# Patient Record
Sex: MALE | URBAN - METROPOLITAN AREA
[De-identification: names, ages, dates, MRNs, and addresses within clinical notes are randomized per-mention and may not be internally consistent; named-entity substitution may affect disease eponyms.]

---

## 2020-12-14 ENCOUNTER — OUT OF OFFICE VISIT (OUTPATIENT)
Dept: URBAN - METROPOLITAN AREA MEDICAL CENTER 1 | Facility: MEDICAL CENTER | Age: 70
End: 2020-12-14
Payer: COMMERCIAL

## 2020-12-14 DIAGNOSIS — I85.11 SECONDARY ESOPHAGEAL VARICES WITH BLEEDING: ICD-10-CM

## 2020-12-14 DIAGNOSIS — I10 ACCELERATED ESSENTIAL HYPERTENSION: ICD-10-CM

## 2020-12-14 DIAGNOSIS — K70.30 ALC (ALCOHOLIC LIVER CIRRHOSIS): ICD-10-CM

## 2020-12-14 DIAGNOSIS — K92.0 BLOODY EMESIS: ICD-10-CM

## 2020-12-14 DIAGNOSIS — E11.9 ABNORMAL METABOLIC STATE DUE TO DIABETES MELLITUS: ICD-10-CM

## 2020-12-14 PROCEDURE — 99254 IP/OBS CNSLTJ NEW/EST MOD 60: CPT | Performed by: INTERNAL MEDICINE

## 2020-12-14 PROCEDURE — G0500 MOD SEDAT ENDO SERVICE >5YRS: HCPCS | Performed by: INTERNAL MEDICINE

## 2020-12-14 PROCEDURE — 43244 EGD VARICES LIGATION: CPT | Performed by: INTERNAL MEDICINE

## 2020-12-15 ENCOUNTER — OUT OF OFFICE VISIT (OUTPATIENT)
Dept: URBAN - METROPOLITAN AREA MEDICAL CENTER 1 | Facility: MEDICAL CENTER | Age: 70
End: 2020-12-15
Payer: MEDICARE

## 2020-12-15 DIAGNOSIS — K92.0 BLOODY EMESIS: ICD-10-CM

## 2020-12-15 DIAGNOSIS — I85.10 ESOPH VARICE OTHER DIS: ICD-10-CM

## 2020-12-15 DIAGNOSIS — D62 ACUTE BLOOD LOSS ANEMIA: ICD-10-CM

## 2020-12-15 DIAGNOSIS — K70.31 ALCOHOLIC CIRRHOSIS OF LIVER WITH ASCITES: ICD-10-CM

## 2020-12-15 PROCEDURE — 99232 SBSQ HOSP IP/OBS MODERATE 35: CPT | Performed by: INTERNAL MEDICINE

## 2020-12-18 ENCOUNTER — OUT OF OFFICE VISIT (OUTPATIENT)
Dept: URBAN - METROPOLITAN AREA MEDICAL CENTER 1 | Facility: MEDICAL CENTER | Age: 70
End: 2020-12-18
Payer: MEDICARE

## 2020-12-18 DIAGNOSIS — K92.0 BLOODY EMESIS: ICD-10-CM

## 2020-12-18 DIAGNOSIS — I85.10 ESOPH VARICE OTHER DIS: ICD-10-CM

## 2020-12-18 DIAGNOSIS — D62 ACUTE BLOOD LOSS ANEMIA: ICD-10-CM

## 2020-12-18 DIAGNOSIS — K70.31 ALCOHOLIC CIRRHOSIS OF LIVER WITH ASCITES: ICD-10-CM

## 2020-12-18 PROCEDURE — 99232 SBSQ HOSP IP/OBS MODERATE 35: CPT | Performed by: INTERNAL MEDICINE

## 2021-01-11 ENCOUNTER — LAB OUTSIDE AN ENCOUNTER (OUTPATIENT)
Dept: URBAN - NONMETROPOLITAN AREA CLINIC 2 | Facility: CLINIC | Age: 71
End: 2021-01-11

## 2021-01-11 ENCOUNTER — OFFICE VISIT (OUTPATIENT)
Dept: URBAN - NONMETROPOLITAN AREA CLINIC 2 | Facility: CLINIC | Age: 71
End: 2021-01-11
Payer: MEDICARE

## 2021-01-11 DIAGNOSIS — Z12.11 COLON CANCER SCREENING: ICD-10-CM

## 2021-01-11 DIAGNOSIS — I85.00 ESOPHAGEAL VARICES: ICD-10-CM

## 2021-01-11 DIAGNOSIS — K70.31 ALCOHOLIC CIRRHOSIS OF LIVER WITH ASCITES: ICD-10-CM

## 2021-01-11 PROCEDURE — 1036F TOBACCO NON-USER: CPT | Performed by: INTERNAL MEDICINE

## 2021-01-11 PROCEDURE — 3017F COLORECTAL CA SCREEN DOC REV: CPT | Performed by: INTERNAL MEDICINE

## 2021-01-11 PROCEDURE — 99214 OFFICE O/P EST MOD 30 MIN: CPT | Performed by: INTERNAL MEDICINE

## 2021-01-11 PROCEDURE — G8427 DOCREV CUR MEDS BY ELIG CLIN: HCPCS | Performed by: INTERNAL MEDICINE

## 2021-01-11 PROCEDURE — G9903 PT SCRN TBCO ID AS NON USER: HCPCS | Performed by: INTERNAL MEDICINE

## 2021-01-11 NOTE — HPI-TODAY'S VISIT:
70 year old with history of HCV/EtOH cirrhosis c/b ascites requiring LVP with most recent in December 2020 with 4.2L removed currently on furosemide and amiloride, esophaegal variceal bleed 12/2020 with 5 bands placed, history of peyronies disease s/p penile implant, hypertension, diabetes mellitus, currently followed at [facility], who presents for post-hospitalization follow-up.  Mr. Kurtz was hospitalied at [facility] in [place] 2020 with an esophageal variceal bleed. He underwent an EGD which showed 4 columns of grade 2-3 esophageal varices as well as evidence of a red spot consistnet with recnet bleeding. He underwent banding x 5. He underwent a large volume paracentesis during the hospitalizaiton with 4.2L removed which was negative for SBP. He was treated with octreotide and SBP prophylaxis with CTX. He was re-started on furosemide + amiloride at discharge.  Since discharge, he denies melena, hematochezia, hematemesis, abdominal distention, weight gain, lower extremity edema.   Prior Evaluation:  1/22/2020: HCV antibody reactive, HCV PCR not detected. Hepatitis B surface antigen non-reactive, hepatitis B surface antibody non-reactive, hepatitis B core antibody reactive. 12/14/2020: EGD FINDINGS / IMPRESSION: 1.  There was evidence of old blood in the stomach and in the duodenum. 2.  There is mild gastritis of the antrum. 3.  The GE junction was located at 42 cm from the gums 4.  There were 4 columns of grade 2-3 esophageal varices.  There was evidence of a red spot consistent with recent bleeding.  These were banded x5.  The patient tolerated the procedure well.  12/2020: Underwent 4.2L paracentesis. Negative for SBP.

## 2021-01-11 NOTE — PHYSICAL EXAM GASTROINTESTINAL
Abdomen , soft, nontender, nondistended , no guarding or rigidity , no masses palpable , normal bowel sounds , Liver and Spleen , liver nontender

## 2021-01-13 LAB
A/G RATIO: 1.5
AFP, SERUM, TUMOR MARKER: 7.7
ALBUMIN: 3.4
ALKALINE PHOSPHATASE: 107
ALT (SGPT): 27
AST (SGOT): 35
BASO (ABSOLUTE): 0
BASOS: 1
BILIRUBIN, TOTAL: 0.9
BUN/CREATININE RATIO: 23
BUN: 38
CALCIUM: 8.7
CARBON DIOXIDE, TOTAL: 24
CHLORIDE: 105
CREATININE: 1.62
EGFR IF AFRICN AM: 50
EGFR IF NONAFRICN AM: 43
EOS (ABSOLUTE): 0.1
EOS: 6
GLOBULIN, TOTAL: 2.2
GLUCOSE: 438
HBV GENOTYPE: (no result)
HBV IU/ML: (no result)
HEMATOCRIT: 22.7
HEMATOLOGY COMMENTS:: (no result)
HEMOGLOBIN: 7
IMMATURE CELLS: (no result)
IMMATURE GRANS (ABS): (no result)
IMMATURE GRANULOCYTES: (no result)
INR: 1.1
LOG10 HBV AS IU/ML: (no result)
LYMPHS (ABSOLUTE): 0.2
LYMPHS: 13
MCH: 28.5
MCHC: 30.8
MCV: 92
MONOCYTES(ABSOLUTE): 0.1
MONOCYTES: 8
NEUTROPHILS (ABSOLUTE): 1.3
NEUTROPHILS: 72
NRBC: (no result)
PLATELETS: 28
POTASSIUM: 4.7
PROTEIN, TOTAL: 5.6
PROTHROMBIN TIME: 11.7
RBC: 2.46
RDW: 13.6
SODIUM: 140
TEST INFORMATION:: (no result)
WBC: 1.8

## 2021-01-19 ENCOUNTER — OFFICE VISIT (OUTPATIENT)
Dept: URBAN - METROPOLITAN AREA MEDICAL CENTER 1 | Facility: MEDICAL CENTER | Age: 71
End: 2021-01-19
Payer: MEDICARE

## 2021-01-19 ENCOUNTER — TELEPHONE ENCOUNTER (OUTPATIENT)
Dept: URBAN - NONMETROPOLITAN AREA CLINIC 2 | Facility: CLINIC | Age: 71
End: 2021-01-19

## 2021-01-19 DIAGNOSIS — K31.89 ACQUIRED DEFORMITY OF DUODENUM: ICD-10-CM

## 2021-01-19 DIAGNOSIS — K76.6 CLINICALLY SIGNIFICANT PORTAL HYPERTENSION: ICD-10-CM

## 2021-01-19 DIAGNOSIS — I85.10 ESOPH VARICE OTHER DIS: ICD-10-CM

## 2021-01-19 PROCEDURE — 43244 EGD VARICES LIGATION: CPT | Performed by: INTERNAL MEDICINE

## 2021-01-19 RX ORDER — PANTOPRAZOLE SODIUM 40 MG/1
1 TABLET TABLET, DELAYED RELEASE ORAL ONCE A DAY
Qty: 30 | OUTPATIENT
Start: 2021-01-19

## 2021-01-20 ENCOUNTER — TELEPHONE ENCOUNTER (OUTPATIENT)
Dept: URBAN - NONMETROPOLITAN AREA CLINIC 2 | Facility: CLINIC | Age: 71
End: 2021-01-20

## 2021-02-16 ENCOUNTER — TELEPHONE ENCOUNTER (OUTPATIENT)
Dept: URBAN - NONMETROPOLITAN AREA CLINIC 2 | Facility: CLINIC | Age: 71
End: 2021-02-16

## 2021-02-16 ENCOUNTER — OFFICE VISIT (OUTPATIENT)
Dept: URBAN - METROPOLITAN AREA MEDICAL CENTER 1 | Facility: MEDICAL CENTER | Age: 71
End: 2021-02-16
Payer: MEDICARE

## 2021-02-16 DIAGNOSIS — K76.6 CLINICALLY SIGNIFICANT PORTAL HYPERTENSION: ICD-10-CM

## 2021-02-16 DIAGNOSIS — K31.89 ACQUIRED DEFORMITY OF DUODENUM: ICD-10-CM

## 2021-02-16 DIAGNOSIS — I85.10 ESOPH VARICE OTHER DIS: ICD-10-CM

## 2021-02-16 PROCEDURE — 43244 EGD VARICES LIGATION: CPT | Performed by: INTERNAL MEDICINE

## 2021-02-16 RX ORDER — PANTOPRAZOLE SODIUM 40 MG/1
1 TABLET TABLET, DELAYED RELEASE ORAL ONCE A DAY
Qty: 30 | Status: ACTIVE | COMMUNITY
Start: 2021-01-19

## 2021-03-02 ENCOUNTER — OFFICE VISIT (OUTPATIENT)
Dept: URBAN - NONMETROPOLITAN AREA CLINIC 2 | Facility: CLINIC | Age: 71
End: 2021-03-02

## 2021-03-02 RX ORDER — PANTOPRAZOLE SODIUM 40 MG/1
1 TABLET TABLET, DELAYED RELEASE ORAL ONCE A DAY
Qty: 30 | Status: ACTIVE | COMMUNITY
Start: 2021-01-19

## 2021-04-06 ENCOUNTER — TELEPHONE ENCOUNTER (OUTPATIENT)
Dept: URBAN - NONMETROPOLITAN AREA CLINIC 2 | Facility: CLINIC | Age: 71
End: 2021-04-06

## 2021-04-06 ENCOUNTER — OFFICE VISIT (OUTPATIENT)
Dept: URBAN - METROPOLITAN AREA MEDICAL CENTER 1 | Facility: MEDICAL CENTER | Age: 71
End: 2021-04-06
Payer: MEDICARE

## 2021-04-06 DIAGNOSIS — Z12.11 COLON CANCER SCREENING: ICD-10-CM

## 2021-04-06 PROCEDURE — 992 NON-BILLABLE: Performed by: INTERNAL MEDICINE

## 2021-04-06 RX ORDER — PANTOPRAZOLE SODIUM 40 MG/1
1 TABLET TABLET, DELAYED RELEASE ORAL ONCE A DAY
Qty: 30 | Status: ACTIVE | COMMUNITY
Start: 2021-01-19

## 2021-04-07 ENCOUNTER — TELEPHONE ENCOUNTER (OUTPATIENT)
Dept: URBAN - NONMETROPOLITAN AREA CLINIC 2 | Facility: CLINIC | Age: 71
End: 2021-04-07

## 2021-04-19 ENCOUNTER — TELEPHONE ENCOUNTER (OUTPATIENT)
Dept: URBAN - NONMETROPOLITAN AREA CLINIC 2 | Facility: CLINIC | Age: 71
End: 2021-04-19

## 2021-04-26 ENCOUNTER — LAB OUTSIDE AN ENCOUNTER (OUTPATIENT)
Dept: URBAN - NONMETROPOLITAN AREA CLINIC 2 | Facility: CLINIC | Age: 71
End: 2021-04-26

## 2021-05-04 ENCOUNTER — OFFICE VISIT (OUTPATIENT)
Dept: URBAN - METROPOLITAN AREA MEDICAL CENTER 1 | Facility: MEDICAL CENTER | Age: 71
End: 2021-05-04

## 2021-05-17 ENCOUNTER — OFFICE VISIT (OUTPATIENT)
Dept: URBAN - NONMETROPOLITAN AREA CLINIC 2 | Facility: CLINIC | Age: 71
End: 2021-05-17

## 2021-07-07 ENCOUNTER — OUT OF OFFICE VISIT (OUTPATIENT)
Dept: URBAN - METROPOLITAN AREA MEDICAL CENTER 1 | Facility: MEDICAL CENTER | Age: 71
End: 2021-07-07
Payer: MEDICARE

## 2021-07-07 DIAGNOSIS — E11.9 ABNORMAL METABOLIC STATE DUE TO DIABETES MELLITUS: ICD-10-CM

## 2021-07-07 DIAGNOSIS — D62 ACUTE BLOOD LOSS ANEMIA: ICD-10-CM

## 2021-07-07 DIAGNOSIS — K70.30 ALC (ALCOHOLIC LIVER CIRRHOSIS): ICD-10-CM

## 2021-07-07 DIAGNOSIS — K92.1 BLACK STOOL: ICD-10-CM

## 2021-07-07 DIAGNOSIS — K29.01 ACUTE GASTRITIS WITH BLEEDING: ICD-10-CM

## 2021-07-07 PROCEDURE — G8427 DOCREV CUR MEDS BY ELIG CLIN: HCPCS | Performed by: INTERNAL MEDICINE

## 2021-07-07 PROCEDURE — 99222 1ST HOSP IP/OBS MODERATE 55: CPT | Performed by: INTERNAL MEDICINE

## 2021-07-07 PROCEDURE — 99232 SBSQ HOSP IP/OBS MODERATE 35: CPT | Performed by: INTERNAL MEDICINE

## 2021-07-07 PROCEDURE — 43255 EGD CONTROL BLEEDING ANY: CPT | Performed by: INTERNAL MEDICINE

## 2021-08-03 ENCOUNTER — TELEPHONE ENCOUNTER (OUTPATIENT)
Dept: URBAN - METROPOLITAN AREA CLINIC 23 | Facility: CLINIC | Age: 71
End: 2021-08-03

## 2021-08-04 ENCOUNTER — OUT OF OFFICE VISIT (OUTPATIENT)
Dept: URBAN - METROPOLITAN AREA MEDICAL CENTER 1 | Facility: MEDICAL CENTER | Age: 71
End: 2021-08-04
Payer: MEDICARE

## 2021-08-04 DIAGNOSIS — K70.30 ALC (ALCOHOLIC LIVER CIRRHOSIS): ICD-10-CM

## 2021-08-04 DIAGNOSIS — D64.89 ANEMIA DUE TO OTHER CAUSE: ICD-10-CM

## 2021-08-04 PROCEDURE — G8427 DOCREV CUR MEDS BY ELIG CLIN: HCPCS | Performed by: INTERNAL MEDICINE

## 2021-08-04 PROCEDURE — 99222 1ST HOSP IP/OBS MODERATE 55: CPT | Performed by: INTERNAL MEDICINE

## 2021-08-04 PROCEDURE — 99232 SBSQ HOSP IP/OBS MODERATE 35: CPT | Performed by: INTERNAL MEDICINE

## 2021-08-06 ENCOUNTER — OUT OF OFFICE VISIT (OUTPATIENT)
Dept: URBAN - METROPOLITAN AREA MEDICAL CENTER 1 | Facility: MEDICAL CENTER | Age: 71
End: 2021-08-06
Payer: MEDICARE

## 2021-08-06 DIAGNOSIS — D50.0 ANEMIA DUE TO BLOOD LOSS: ICD-10-CM

## 2021-08-06 DIAGNOSIS — K31.811 ANGIODYSPLASIA OF STOMACH AND DUODENUM WITH BLEEDING: ICD-10-CM

## 2021-08-06 PROCEDURE — 43235 EGD DIAGNOSTIC BRUSH WASH: CPT | Performed by: INTERNAL MEDICINE

## 2021-08-12 ENCOUNTER — OFFICE VISIT (OUTPATIENT)
Dept: URBAN - NONMETROPOLITAN AREA CLINIC 2 | Facility: CLINIC | Age: 71
End: 2021-08-12
Payer: MEDICARE

## 2021-08-12 ENCOUNTER — TELEPHONE ENCOUNTER (OUTPATIENT)
Dept: URBAN - METROPOLITAN AREA CLINIC 92 | Facility: CLINIC | Age: 71
End: 2021-08-12

## 2021-08-12 VITALS
SYSTOLIC BLOOD PRESSURE: 129 MMHG | WEIGHT: 182.8 LBS | BODY MASS INDEX: 25.59 KG/M2 | HEART RATE: 84 BPM | HEIGHT: 71 IN | DIASTOLIC BLOOD PRESSURE: 61 MMHG

## 2021-08-12 DIAGNOSIS — R18.8 ASCITES: ICD-10-CM

## 2021-08-12 DIAGNOSIS — K31.819 GAVE (GASTRIC ANTRAL VASCULAR ECTASIA): ICD-10-CM

## 2021-08-12 DIAGNOSIS — Z12.11 COLON CANCER SCREENING: ICD-10-CM

## 2021-08-12 DIAGNOSIS — D50.0 IRON DEFICIENCY ANEMIA DUE TO CHRONIC BLOOD LOSS: ICD-10-CM

## 2021-08-12 DIAGNOSIS — D69.6 THROMBOCYTOPENIA: ICD-10-CM

## 2021-08-12 DIAGNOSIS — B19.21 HEPATITIS C VIRUS INFECTION WITH HEPATIC COMA, UNSPECIFIED CHRONICITY: ICD-10-CM

## 2021-08-12 DIAGNOSIS — K21.9 GERD: ICD-10-CM

## 2021-08-12 DIAGNOSIS — K70.31 ALCOHOLIC CIRRHOSIS OF LIVER WITH ASCITES: ICD-10-CM

## 2021-08-12 DIAGNOSIS — I85.00 ESOPHAGEAL VARICES: ICD-10-CM

## 2021-08-12 PROCEDURE — 99214 OFFICE O/P EST MOD 30 MIN: CPT | Performed by: INTERNAL MEDICINE

## 2021-08-12 RX ORDER — RIFAXIMIN 550 MG/1
1 TABLET TABLET ORAL TWICE A DAY
Status: ACTIVE | COMMUNITY

## 2021-08-12 RX ORDER — PANTOPRAZOLE SODIUM 40 MG/1
1 TABLET TABLET, DELAYED RELEASE ORAL ONCE A DAY
Qty: 30 | Status: ACTIVE | COMMUNITY
Start: 2021-01-19

## 2021-08-12 NOTE — HPI-TODAY'S VISIT:
70 year old with history of HCV/EtOH cirrhosis c/b ascites requiring LVP with most recent in December 2020 with 4.2L removed currently on furosemide and amiloride, esophaegal variceal bleed 12/2020 with 5 bands placed, history of peyronies disease s/p penile implant, hypertension, diabetes mellitus, currently followed at [facility], who presents for post-hospitalization follow-up.  Mr. Kurtz was hospitalied at [facility] in [place] 2020 with an esophageal variceal bleed. He underwent an EGD which showed 4 columns of grade 2-3 esophageal varices as well as evidence of a red spot consistnet with recnet bleeding. He underwent banding x 5. He underwent a large volume paracentesis during the hospitalizaiton with 4.2L removed which was negative for SBP. He was treated with octreotide and SBP prophylaxis with CTX. He was re-started on furosemide + amiloride at discharge.  Since discharge, he denies melena, hematochezia, hematemesis, abdominal distention, weight gain, lower extremity edema.  12/2020: Underwent 4.2L paracentesis. Negative for SBP.   Prior Evaluation:  1/22/2020: HCV antibody reactive, HCV PCR not detected. Hepatitis B surface antigen non-reactive, hepatitis B surface antibody non-reactive, hepatitis B core antibody reactive. 12/14/2020: EGD FINDINGS / IMPRESSION: 1.  There was evidence of old blood in the stomach and in the duodenum. 2.  There is mild gastritis of the antrum. 3.  The GE junction was located at 42 cm from the gums 4.  There were 4 columns of grade 2-3 esophageal varices.  There was evidence of a red spot consistent with recent bleeding.  These were banded x5.  The patient tolerated the procedure well.  8/12/2021 Mr. 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  Since his last office appointment with Dr. Diaz sent he has been in the hospital multiple times.  On May 7 he underwent TIPS per IR.  He was subsequently admitted May 14 for possible transplant however the liver was not appropriate.  Since then he has been to the hospital several times for anemia, most recently he was admitted on August 3 after being referred to the ED by his primary care physician for hemoglobin of 6.  He underwent upper endoscopy by Dr. Darian Obrien on August 6 with gave, he had active oozing, unfortunately his platelet count was too low for APC.  He was discharged on twice daily PPI and sulfur fate 4 times daily.  He has been taking his sulfur fate with his other medications.  His last colonoscopy was done over 5 years ago by Dr. Paulie Lao, he thinks that this was normal.  Yesterday he was reactivated on the liver transplant list per Dr. Butler seen.   Was seen request possible repeat EGD with APC using Dr. Reed campuzano for his thrombocytopenia.  Unfortunately yesterday his platelet count was 19,000.  His hemoglobin was still above 7.  He is passing dark stools however no active bleeding.  He denies any nausea or vomiting.  His meld yesterday was 16.  He is on diuretics including Lasix and amiloride.  He has no significant reaccumulation, however he does have abdominal distention with ascites even status post TIPS.  Today we had a long discussion regarding his chronic anemia and GI bleeding.  I am concerned him taking his Carafate with his other medications as rendering his other medication ineffective.  I will review repeat EGD with Dr. Vicente utilizing Doptelet once we have made the referral to hematology for his acute thrombocytopenia.  We also had a long discussion regarding his prognosis.  He is at risk for spontaneous bleeding given his thrombocytopenia, his he and his wife understand he needs to proceed to the ED with any symptoms.  We have an arranged appointment with Dr. Birch with you CBC tomorrow morning at 730.  MB

## 2021-08-13 ENCOUNTER — TELEPHONE ENCOUNTER (OUTPATIENT)
Dept: URBAN - NONMETROPOLITAN AREA CLINIC 2 | Facility: CLINIC | Age: 71
End: 2021-08-13

## 2021-08-13 RX ORDER — SUCRALFATE 1 G/1
1 PO BID BEFORE LUNCH AND BEDTIME NOT W/I 1 HOUR OF OTHER MEDS TABLET ORAL BID
Qty: 180 TABLET | Refills: 3 | OUTPATIENT
Start: 2021-08-17 | End: 2022-08-12

## 2021-08-13 RX ORDER — PANTOPRAZOLE SODIUM 40 MG/1
1 TABLET TABLET, DELAYED RELEASE ORAL
Qty: 180 TABLET | Refills: 3
Start: 2021-01-19

## 2021-08-26 ENCOUNTER — OFFICE VISIT (OUTPATIENT)
Dept: URBAN - NONMETROPOLITAN AREA CLINIC 2 | Facility: CLINIC | Age: 71
End: 2021-08-26

## 2021-08-28 ENCOUNTER — TELEPHONE ENCOUNTER (OUTPATIENT)
Dept: URBAN - METROPOLITAN AREA CLINIC 13 | Facility: CLINIC | Age: 71
End: 2021-08-28

## 2021-08-29 ENCOUNTER — TELEPHONE ENCOUNTER (OUTPATIENT)
Dept: URBAN - METROPOLITAN AREA CLINIC 13 | Facility: CLINIC | Age: 71
End: 2021-08-29

## 2021-09-16 ENCOUNTER — LAB OUTSIDE AN ENCOUNTER (OUTPATIENT)
Dept: URBAN - NONMETROPOLITAN AREA CLINIC 2 | Facility: CLINIC | Age: 71
End: 2021-09-16

## 2021-09-16 ENCOUNTER — TELEPHONE ENCOUNTER (OUTPATIENT)
Dept: URBAN - NONMETROPOLITAN AREA CLINIC 2 | Facility: CLINIC | Age: 71
End: 2021-09-16

## 2021-09-16 ENCOUNTER — OFFICE VISIT (OUTPATIENT)
Dept: URBAN - NONMETROPOLITAN AREA CLINIC 2 | Facility: CLINIC | Age: 71
End: 2021-09-16
Payer: MEDICARE

## 2021-09-16 VITALS
HEART RATE: 84 BPM | DIASTOLIC BLOOD PRESSURE: 52 MMHG | HEIGHT: 71 IN | WEIGHT: 180 LBS | SYSTOLIC BLOOD PRESSURE: 157 MMHG | TEMPERATURE: 97.7 F | BODY MASS INDEX: 25.2 KG/M2

## 2021-09-16 DIAGNOSIS — Z12.11 COLON CANCER SCREENING: ICD-10-CM

## 2021-09-16 DIAGNOSIS — D50.0 IRON DEFICIENCY ANEMIA DUE TO CHRONIC BLOOD LOSS: ICD-10-CM

## 2021-09-16 DIAGNOSIS — R18.8 ASCITES: ICD-10-CM

## 2021-09-16 DIAGNOSIS — B19.21 HEPATITIS C VIRUS INFECTION WITH HEPATIC COMA, UNSPECIFIED CHRONICITY: ICD-10-CM

## 2021-09-16 DIAGNOSIS — K21.9 GERD: ICD-10-CM

## 2021-09-16 DIAGNOSIS — K31.819 GAVE (GASTRIC ANTRAL VASCULAR ECTASIA): ICD-10-CM

## 2021-09-16 DIAGNOSIS — K70.31 ALCOHOLIC CIRRHOSIS OF LIVER WITH ASCITES: ICD-10-CM

## 2021-09-16 DIAGNOSIS — I85.00 ESOPHAGEAL VARICES: ICD-10-CM

## 2021-09-16 DIAGNOSIS — D69.6 THROMBOCYTOPENIA: ICD-10-CM

## 2021-09-16 PROCEDURE — 99215 OFFICE O/P EST HI 40 MIN: CPT | Performed by: NURSE PRACTITIONER

## 2021-09-16 RX ORDER — PANTOPRAZOLE SODIUM 40 MG/1
1 TABLET TABLET, DELAYED RELEASE ORAL
Qty: 180 TABLET | Refills: 3 | Status: ACTIVE | COMMUNITY
Start: 2021-01-19

## 2021-09-16 RX ORDER — SUCRALFATE 1 G/1
1 PO BID BEFORE LUNCH AND BEDTIME NOT W/I 1 HOUR OF OTHER MEDS TABLET ORAL BID
Qty: 180 TABLET | Refills: 3 | Status: ACTIVE | COMMUNITY
Start: 2021-08-17 | End: 2022-08-12

## 2021-09-16 RX ORDER — RIFAXIMIN 550 MG/1
1 TABLET TABLET ORAL TWICE A DAY
Status: ACTIVE | COMMUNITY

## 2021-09-16 RX ORDER — PANTOPRAZOLE SODIUM 40 MG/1
1 TABLET TABLET, DELAYED RELEASE ORAL
Qty: 180 TABLET | Refills: 3
Start: 2021-01-19

## 2021-09-16 RX ORDER — SUCRALFATE 1 G/1
1 PO BID BEFORE LUNCH AND BEDTIME NOT W/I 1 HOUR OF OTHER MEDS TABLET ORAL BID
Qty: 180 TABLET | Refills: 3
Start: 2021-08-17 | End: 2022-09-11

## 2021-09-16 NOTE — HPI-TODAY'S VISIT:
70 year old with history of HCV/EtOH cirrhosis c/b ascites requiring LVP with most recent in December 2020 with 4.2L removed currently on furosemide and amiloride, esophaegal variceal bleed 12/2020 with 5 bands placed, history of peyronies disease s/p penile implant, hypertension, diabetes mellitus, currently followed at [facility], who presents for post-hospitalization follow-up.  Mr. Kurtz was hospitalied at [facility] in [place] 2020 with an esophageal variceal bleed. He underwent an EGD which showed 4 columns of grade 2-3 esophageal varices as well as evidence of a red spot consistnet with recnet bleeding. He underwent banding x 5. He underwent a large volume paracentesis during the hospitalizaiton with 4.2L removed which was negative for SBP. He was treated with octreotide and SBP prophylaxis with CTX. He was re-started on furosemide + amiloride at discharge.  Since discharge, he denies melena, hematochezia, hematemesis, abdominal distention, weight gain, lower extremity edema.  12/2020: Underwent 4.2L paracentesis. Negative for SBP.   Prior Evaluation:  1/22/2020: HCV antibody reactive, HCV PCR not detected. Hepatitis B surface antigen non-reactive, hepatitis B surface antibody non-reactive, hepatitis B core antibody reactive. 12/14/2020: EGD FINDINGS / IMPRESSION: 1.  There was evidence of old blood in the stomach and in the duodenum. 2.  There is mild gastritis of the antrum. 3.  The GE junction was located at 42 cm from the gums 4.  There were 4 columns of grade 2-3 esophageal varices.  There was evidence of a red spot consistent with recent bleeding.  These were banded x5.  The patient tolerated the procedure well.  8/12/2021 Mr. 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  Since his last office appointment with Dr. Diaz sent he has been in the hospital multiple times.  On May 7 he underwent TIPS per IR.  He was subsequently admitted May 14 for possible transplant however the liver was not appropriate.  Since then he has been to the hospital several times for anemia, most recently he was admitted on August 3 after being referred to the ED by his primary care physician for hemoglobin of 6.  He underwent upper endoscopy by Dr. Darian Obrien on August 6 with gave, he had active oozing, unfortunately his platelet count was too low for APC.  He was discharged on twice daily PPI and sulfur fate 4 times daily.  He has been taking his sulfur fate with his other medications.  His last colonoscopy was done over 5 years ago by Dr. Paulie Lao, he thinks that this was normal.  Yesterday he was reactivated on the liver transplant list per Dr. Butler seen.   Was seen request possible repeat EGD with APC using Dr. Reed campuzano for his thrombocytopenia.  Unfortunately yesterday his platelet count was 19,000.  His hemoglobin was still above 7.  He is passing dark stools however no active bleeding.  He denies any nausea or vomiting.  His meld yesterday was 16.  He is on diuretics including Lasix and amiloride.  He has no significant reaccumulation, however he does have abdominal distention with ascites even status post TIPS.  Today we had a long discussion regarding his chronic anemia and GI bleeding.  I am concerned him taking his Carafate with his other medications as rendering his other medication ineffective.  I will review repeat EGD with Dr. Vicente utilizing Doptelet once we have made the referral to hematology for his acute thrombocytopenia.  We also had a long discussion regarding his prognosis.  He is at risk for spontaneous bleeding given his thrombocytopenia, his he and his wife understand he needs to proceed to the ED with any symptoms.  We have an arranged appointment with Dr. Birch with you CBC tomorrow morning at 730.  MB 9/16/2021 Mr. Kurtz presents for follow-up of end-stage liver disease secondary to alcohol hepatitis C with sequela of portal hypertension including esophageal varices status post TIPS, refractory ascites, pancytopenia, and gave.  Since his last visit we did refer him to Dr. Birch.  He has been receiving PRBC weekly.  He did undergo bone marrow biopsy with no etiology of his anemia and thrombocytopenia aside from his liver disease.  I did review his case with Dr. Vicente.  She does agree to repeat his EGD and schedule for colonoscopy, so long as his platelet count is above 25,000.  His encephalopathy is stable on Xifaxan twice daily, he is titrating his lactulose to 2-3 bowel movements daily but usually does not need to take this.  He is on amiloride and Lasix per nephrology, he has had no severe accumulation since his last visit.  He is on twice daily PPI and sulcal fate.  Today he agrees to pursue repeat EGD and colonoscopy.  He is currently listed at [facility] with Dr. Carpio. MB

## 2021-10-14 ENCOUNTER — OFFICE VISIT (OUTPATIENT)
Dept: URBAN - METROPOLITAN AREA MEDICAL CENTER 1 | Facility: MEDICAL CENTER | Age: 71
End: 2021-10-14
Payer: MEDICARE

## 2021-10-14 DIAGNOSIS — K31.819 ACQUIRED ARTERIOVENOUS MALFORMATION OF STOMACH: ICD-10-CM

## 2021-10-14 DIAGNOSIS — D50.9 ANEMIA: ICD-10-CM

## 2021-10-14 PROCEDURE — 45378 DIAGNOSTIC COLONOSCOPY: CPT | Performed by: INTERNAL MEDICINE

## 2021-10-14 PROCEDURE — 43270 EGD LESION ABLATION: CPT | Performed by: INTERNAL MEDICINE

## 2021-10-19 ENCOUNTER — OFFICE VISIT (OUTPATIENT)
Dept: URBAN - NONMETROPOLITAN AREA CLINIC 2 | Facility: CLINIC | Age: 71
End: 2021-10-19

## 2021-11-04 ENCOUNTER — TELEPHONE ENCOUNTER (OUTPATIENT)
Dept: URBAN - NONMETROPOLITAN AREA CLINIC 2 | Facility: CLINIC | Age: 71
End: 2021-11-04

## 2021-11-09 ENCOUNTER — LAB OUTSIDE AN ENCOUNTER (OUTPATIENT)
Dept: URBAN - NONMETROPOLITAN AREA CLINIC 2 | Facility: CLINIC | Age: 71
End: 2021-11-09

## 2021-11-09 ENCOUNTER — DASHBOARD ENCOUNTERS (OUTPATIENT)
Age: 71
End: 2021-11-09

## 2021-11-09 ENCOUNTER — OFFICE VISIT (OUTPATIENT)
Dept: URBAN - NONMETROPOLITAN AREA CLINIC 2 | Facility: CLINIC | Age: 71
End: 2021-11-09
Payer: MEDICARE

## 2021-11-09 DIAGNOSIS — K31.819 GAVE (GASTRIC ANTRAL VASCULAR ECTASIA): ICD-10-CM

## 2021-11-09 DIAGNOSIS — D50.0 IRON DEFICIENCY ANEMIA DUE TO CHRONIC BLOOD LOSS: ICD-10-CM

## 2021-11-09 DIAGNOSIS — D69.6 THROMBOCYTOPENIA: ICD-10-CM

## 2021-11-09 DIAGNOSIS — Z12.11 COLON CANCER SCREENING: ICD-10-CM

## 2021-11-09 DIAGNOSIS — B19.21 HEPATITIS C VIRUS INFECTION WITH HEPATIC COMA, UNSPECIFIED CHRONICITY: ICD-10-CM

## 2021-11-09 DIAGNOSIS — K70.31 ALCOHOLIC CIRRHOSIS OF LIVER WITH ASCITES: ICD-10-CM

## 2021-11-09 DIAGNOSIS — I85.00 ESOPHAGEAL VARICES: ICD-10-CM

## 2021-11-09 DIAGNOSIS — K21.9 GERD: ICD-10-CM

## 2021-11-09 DIAGNOSIS — R18.8 ASCITES: ICD-10-CM

## 2021-11-09 PROBLEM — 28670008 ESOPHAGEAL VARICES: Status: ACTIVE | Noted: 2021-04-01

## 2021-11-09 PROBLEM — 724556004: Status: ACTIVE | Noted: 2021-08-12

## 2021-11-09 PROBLEM — 50711007: Status: ACTIVE | Noted: 2021-08-12

## 2021-11-09 PROBLEM — 420054005: Status: ACTIVE | Noted: 2021-08-12

## 2021-11-09 PROBLEM — 389026000 ASCITES: Status: ACTIVE | Noted: 2021-01-11

## 2021-11-09 PROBLEM — 275978004 COLON CANCER SCREENING: Status: ACTIVE | Noted: 2021-01-11

## 2021-11-09 PROBLEM — 235595009 GASTROESOPHAGEAL REFLUX DISEASE: Status: ACTIVE | Noted: 2021-01-19

## 2021-11-09 PROBLEM — 415116008: Status: ACTIVE | Noted: 2021-08-12

## 2021-11-09 PROCEDURE — 99214 OFFICE O/P EST MOD 30 MIN: CPT | Performed by: INTERNAL MEDICINE

## 2021-11-09 RX ORDER — SUCRALFATE 1 G/1
1 PO BID BEFORE LUNCH AND BEDTIME NOT W/I 1 HOUR OF OTHER MEDS TABLET ORAL BID
Qty: 180 TABLET | Refills: 3 | Status: ACTIVE | COMMUNITY
Start: 2021-08-17 | End: 2022-09-11

## 2021-11-09 RX ORDER — AVATROMBOPAG MALEATE 20 MG/1
2 TABLETS TABLET, FILM COATED ORAL ONCE A DAY
Status: ACTIVE | COMMUNITY

## 2021-11-09 RX ORDER — PANTOPRAZOLE SODIUM 40 MG/1
1 TABLET TABLET, DELAYED RELEASE ORAL
Qty: 180 TABLET | Refills: 3 | Status: ACTIVE | COMMUNITY
Start: 2021-01-19

## 2021-11-09 RX ORDER — RIFAXIMIN 550 MG/1
1 TABLET TABLET ORAL TWICE A DAY
Status: ACTIVE | COMMUNITY

## 2021-11-09 NOTE — HPI-TODAY'S VISIT:
70 year old with history of HCV/EtOH cirrhosis c/b ascites requiring LVP with most recent in December 2020 with 4.2L removed currently on furosemide and amiloride, esophaegal variceal bleed 12/2020 with 5 bands placed, history of peyronies disease s/p penile implant, hypertension, diabetes mellitus, currently followed at [facility], who presents for post-hospitalization follow-up.  Mr. Kurtz was hospitalied at [facility] in [place] 2020 with an esophageal variceal bleed. He underwent an EGD which showed 4 columns of grade 2-3 esophageal varices as well as evidence of a red spot consistnet with recnet bleeding. He underwent banding x 5. He underwent a large volume paracentesis during the hospitalizaiton with 4.2L removed which was negative for SBP. He was treated with octreotide and SBP prophylaxis with CTX. He was re-started on furosemide + amiloride at discharge.  Since discharge, he denies melena, hematochezia, hematemesis, abdominal distention, weight gain, lower extremity edema.  12/2020: Underwent 4.2L paracentesis. Negative for SBP.   Prior Evaluation:  1/22/2020: HCV antibody reactive, HCV PCR not detected. Hepatitis B surface antigen non-reactive, hepatitis B surface antibody non-reactive, hepatitis B core antibody reactive. 12/14/2020: EGD FINDINGS / IMPRESSION: 1.  There was evidence of old blood in the stomach and in the duodenum. 2.  There is mild gastritis of the antrum. 3.  The GE junction was located at 42 cm from the gums 4.  There were 4 columns of grade 2-3 esophageal varices.  There was evidence of a red spot consistent with recent bleeding.  These were banded x5.  The patient tolerated the procedure well.  8/12/2021 Mr. 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  Since his last office appointment with Dr. Diaz sent he has been in the hospital multiple times.  On May 7 he underwent TIPS per IR.  He was subsequently admitted May 14 for possible transplant however the liver was not appropriate.  Since then he has been to the hospital several times for anemia, most recently he was admitted on August 3 after being referred to the ED by his primary care physician for hemoglobin of 6.  He underwent upper endoscopy by Dr. Darian Obrien on August 6 with gave, he had active oozing, unfortunately his platelet count was too low for APC.  He was discharged on twice daily PPI and sulfur fate 4 times daily.  He has been taking his sulfur fate with his other medications.  His last colonoscopy was done over 5 years ago by Dr. Paulie Lao, he thinks that this was normal.  Yesterday he was reactivated on the liver transplant list per Dr. Butler seen.   Was seen request possible repeat EGD with APC using Dr. Reed campuzano for his thrombocytopenia.  Unfortunately yesterday his platelet count was 19,000.  His hemoglobin was still above 7.  He is passing dark stools however no active bleeding.  He denies any nausea or vomiting.  His meld yesterday was 16.  He is on diuretics including Lasix and amiloride.  He has no significant reaccumulation, however he does have abdominal distention with ascites even status post TIPS.  Today we had a long discussion regarding his chronic anemia and GI bleeding.  I am concerned him taking his Carafate with his other medications as rendering his other medication ineffective.  I will review repeat EGD with Dr. Vicente utilizing Doptelet once we have made the referral to hematology for his acute thrombocytopenia.  We also had a long discussion regarding his prognosis.  He is at risk for spontaneous bleeding given his thrombocytopenia, his he and his wife understand he needs to proceed to the ED with any symptoms.  We have an arranged appointment with Dr. Birch with you CBC tomorrow morning at 730.  MB 9/16/2021 Mr. Kurtz presents for follow-up of end-stage liver disease secondary to alcohol hepatitis C with sequela of portal hypertension including esophageal varices status post TIPS, refractory ascites, pancytopenia, and gave.  Since his last visit we did refer him to Dr. Birch.  He has been receiving PRBC weekly.  He did undergo bone marrow biopsy with no etiology of his anemia and thrombocytopenia aside from his liver disease.  I did review his case with Dr. Vicente.  She does agree to repeat his EGD and schedule for colonoscopy, so long as his platelet count is above 25,000.  His encephalopathy is stable on Xifaxan twice daily, he is titrating his lactulose to 2-3 bowel movements daily but usually does not need to take this.  He is on amiloride and Lasix per nephrology, he has had no severe accumulation since his last visit.  He is on twice daily PPI and sulcal fate.  Today he agrees to pursue repeat EGD and colonoscopy.  He is currently listed at [facility] with Dr. Carpio. MB 11/9/2021 The patient presents today for follow-up of his end-stage liver disease.  Since our last visit, he was admitted to the hospital in [facility].  He underwent repeat EGD by Dr. Echeverria with APC.  The patient feels that his stools are no further with melena, but his hemoglobin continues to drop.  He has been evaluated by hematology as well.  They tried him on steroids due to his low platelets given a possible autoimmune component.  Unfortunately, they did not improve.  He is now on Procrit along with Doptelet.  He has not had his labs rechecked.  He is going to have another unit of blood transfused tomorrow.  After discussion with Dr. Echeverria, I am going to repeat his EGD with APC at the beginning of December.  We are also going to consider doing this again in 6 weeks.  In the meantime, he does need to continue his PPI and Carafate.  He does remain on the liver transplant list.  He has a follow-up with Dr. Denny seen later on this month.  He did have a TIPS revision as well when he was admitted to the hospital.  He does not think that his ascites has improved significantly.  He does not feel that he needs a another paracentesis at this point.  He is maintained on his diuretics, and he is maintaining on a low-sodium diet.

## 2021-11-24 PROBLEM — 43935004: Status: ACTIVE | Noted: 2021-08-12

## 2021-11-29 ENCOUNTER — TELEPHONE ENCOUNTER (OUTPATIENT)
Dept: URBAN - NONMETROPOLITAN AREA CLINIC 1 | Facility: CLINIC | Age: 71
End: 2021-11-29

## 2021-12-02 ENCOUNTER — OFFICE VISIT (OUTPATIENT)
Dept: URBAN - NONMETROPOLITAN AREA CLINIC 2 | Facility: CLINIC | Age: 71
End: 2021-12-02

## 2021-12-02 ENCOUNTER — OFFICE VISIT (OUTPATIENT)
Dept: URBAN - METROPOLITAN AREA MEDICAL CENTER 1 | Facility: MEDICAL CENTER | Age: 71
End: 2021-12-02

## 2022-01-06 ENCOUNTER — OFFICE VISIT (OUTPATIENT)
Dept: URBAN - METROPOLITAN AREA MEDICAL CENTER 1 | Facility: MEDICAL CENTER | Age: 72
End: 2022-01-06

## 2022-04-15 ENCOUNTER — OFFICE VISIT (OUTPATIENT)
Dept: URBAN - NONMETROPOLITAN AREA CLINIC 2 | Facility: CLINIC | Age: 72
End: 2022-04-15